# Patient Record
Sex: MALE | Race: WHITE | NOT HISPANIC OR LATINO | Employment: UNEMPLOYED | URBAN - NONMETROPOLITAN AREA
[De-identification: names, ages, dates, MRNs, and addresses within clinical notes are randomized per-mention and may not be internally consistent; named-entity substitution may affect disease eponyms.]

---

## 2019-05-14 ENCOUNTER — HOSPITAL ENCOUNTER (EMERGENCY)
Facility: HOSPITAL | Age: 34
Discharge: HOME | End: 2019-05-14
Attending: EMERGENCY MEDICINE | Admitting: EMERGENCY MEDICINE

## 2019-05-14 VITALS
BODY MASS INDEX: 24.52 KG/M2 | HEIGHT: 73 IN | DIASTOLIC BLOOD PRESSURE: 68 MMHG | OXYGEN SATURATION: 98 % | RESPIRATION RATE: 16 BRPM | WEIGHT: 185 LBS | HEART RATE: 92 BPM | TEMPERATURE: 97.8 F | SYSTOLIC BLOOD PRESSURE: 115 MMHG

## 2019-05-14 DIAGNOSIS — Z13.9 ENCOUNTER FOR MEDICAL SCREENING EXAMINATION: Primary | ICD-10-CM

## 2019-05-14 PROCEDURE — 99283 EMERGENCY DEPT VISIT LOW MDM: CPT

## 2019-05-14 NOTE — DISCHARGE INSTRUCTIONS
Please read and follow all directions.  Tylenol or ibuprofen for discomfort as needed.  Take all home medications as previously prescribed.  Please contact your primary care provider in 2-3 days to arrange for outpatient follow-up.  If you do not have one you may use the list below.  Return to the emergency department sooner if symptoms worsen or for any additional concerns.      Follow up with one of the [facility] physician groups below to setup primary care. If you have trouble making an appointment, please call the [facility] Nurse Line at [phone]    Dr. Brenden Rojas DO, Dr. Sky Osorio DO,  AUSTIN Trejo, and AUSTIN Riley  [facility] - [address] [address], [address], [place] [address] [phone]     Dr. David Bailey MD  [facility] - [address] [address], [address], [place] [address] [phone]    Dr. Gabriella Guerrier MD, and AUSTIN Washington  [facility] - [address] [address], [place], [place] [id number] [phone]    Dr. Eugene Patrick MD and Dr. Bk Álvarez MD  [address], [address]  [phone]    Dr. Parker Bedoya MD  [facility]  [address] [place] [address], [facility], [place], [place], [address] [phone]    Dr. Easton Herrera MD  [facility] [place]  [address], [place], [address] [phone]

## 2019-05-14 NOTE — ED PROVIDER NOTES
Subjective   This is a 33-year-old male who has been brought to this emergency department from [facility] for evaluation of an episode of confusion/disorientation.  Patient indicates that he woke up this morning for a 6 AM meeting.  He then went back to bed.  He was confused when he woke up thinking that he had overslept for lunch.  He was slightly confused about the timeline per EMS providers but was oriented otherwise.  Patient denies any drug or alcohol usage.  No fall or trauma.  He has had a recent cough but he does not feel short of breath.  No wheezing.  He relates this to smoking.  Over the last several days he indicates that he has had some intermittent chest pain but is currently chest pain-free.  No palpitations or dizziness.  No GI/ complaints.  No headache.  No loss of vision.  No paresthesias or other neurologic changes.  He does not feel unsteady on his feet.  He is otherwise asymptomatic and denies any additional complaints.  He was brought here for medical clearance.            Review of Systems   All other systems reviewed and are negative.      History reviewed. No pertinent past medical history.    No Known Allergies    History reviewed. No pertinent surgical history.    History reviewed. No pertinent family history.    Social History     Socioeconomic History   • Marital status:      Spouse name: Not on file   • Number of children: Not on file   • Years of education: Not on file   • Highest education level: Not on file   Tobacco Use   • Smoking status: Current Every Day Smoker     Packs/day: 1.00     Types: Cigarettes   • Smokeless tobacco: Never Used   Substance and Sexual Activity   • Alcohol use: No     Frequency: Never   • Drug use: No   • Sexual activity: No           Objective   Physical Exam   Constitutional: He is oriented to person, place, and time. He appears well-developed and well-nourished.   HENT:   Head: Normocephalic and atraumatic.   Eyes: EOM are normal. Pupils are equal,  round, and reactive to light.   Neck: Normal range of motion. Neck supple. No JVD present. No tracheal deviation present.   Cardiovascular: Normal rate, regular rhythm and normal heart sounds.   Pulmonary/Chest: Effort normal and breath sounds normal. No respiratory distress. He has no wheezes. He has no rales. He exhibits no tenderness.   Abdominal: Soft. Bowel sounds are normal. There is no tenderness. There is no guarding.   Musculoskeletal: He exhibits no edema or deformity.   Neurological: He is alert and oriented to person, place, and time. No cranial nerve deficit or sensory deficit. He exhibits normal muscle tone. Coordination normal.   Normal gait   Skin: Skin is warm and dry. Capillary refill takes less than 2 seconds.   Psychiatric: He has a normal mood and affect. His behavior is normal.   Nursing note and vitals reviewed.      Procedures           ED Course      Report taken from EMS personnel  Patient interviewed and examined  Patient refusing any testing or intervention  Patient medically cleared for discharge            MDM  Patient relates his confusion to thinking that he had overslept for lunch when in fact he had only slept a very short time  He is awake, alert, and fully oriented  He is neuro intact with a steady gait  He denies any drug or alcohol usage and has no symptomatology or physical exam findings suspicious for acute intoxication  He is declining any testing for his recent cough and chest pain  He is declining any intervention and is requesting discharge  Patient is medically cleared for discharge  Have encouraged return if his symptoms worsen or should he change his mind      Final diagnoses:   Encounter for medical screening examination            Marlo Palm,   05/14/19 5230

## 2019-05-25 ENCOUNTER — APPOINTMENT (OUTPATIENT)
Dept: CT IMAGING | Facility: HOSPITAL | Age: 34
End: 2019-05-25

## 2019-05-25 ENCOUNTER — HOSPITAL ENCOUNTER (EMERGENCY)
Facility: HOSPITAL | Age: 34
Discharge: HOME | End: 2019-05-25
Admitting: EMERGENCY MEDICINE

## 2019-05-25 VITALS
TEMPERATURE: 98.1 F | SYSTOLIC BLOOD PRESSURE: 140 MMHG | WEIGHT: 177.6 LBS | BODY MASS INDEX: 22.79 KG/M2 | HEIGHT: 74 IN | DIASTOLIC BLOOD PRESSURE: 80 MMHG | OXYGEN SATURATION: 100 % | HEART RATE: 72 BPM | RESPIRATION RATE: 14 BRPM

## 2019-05-25 DIAGNOSIS — G44.209 TENSION HEADACHE: Primary | ICD-10-CM

## 2019-05-25 PROCEDURE — 96372 THER/PROPH/DIAG INJ SC/IM: CPT

## 2019-05-25 PROCEDURE — 70450 CT HEAD/BRAIN W/O DYE: CPT

## 2019-05-25 PROCEDURE — 99283 EMERGENCY DEPT VISIT LOW MDM: CPT

## 2019-05-25 PROCEDURE — 25010000002 KETOROLAC TROMETHAMINE PER 15 MG: Performed by: PHYSICIAN ASSISTANT

## 2019-05-25 RX ORDER — KETOROLAC TROMETHAMINE 10 MG/1
10 TABLET, FILM COATED ORAL EVERY 6 HOURS PRN
Qty: 5 TABLET | Refills: 0 | Status: SHIPPED | OUTPATIENT
Start: 2019-05-25 | End: 2019-07-05

## 2019-05-25 RX ORDER — KETOROLAC TROMETHAMINE 30 MG/ML
60 INJECTION, SOLUTION INTRAMUSCULAR; INTRAVENOUS ONCE
Status: COMPLETED | OUTPATIENT
Start: 2019-05-25 | End: 2019-05-25

## 2019-05-25 RX ADMIN — KETOROLAC TROMETHAMINE 60 MG: 30 INJECTION, SOLUTION INTRAMUSCULAR at 18:32
